# Patient Record
(demographics unavailable — no encounter records)

---

## 2025-05-21 NOTE — HISTORY OF PRESENT ILLNESS
[FreeTextEntry1] : Age Raghav 88 YO, presents for Second opinion. G 7   P [id number] - 7  Menopausal No Medication No family history of breast cancer. To do monthly SBE. Concerned about ovarian cyst seen on MRI by her PCP Dr. Gutierrez Daughter-in-law, Betty Avilez, interpreted. Both ovaries with cysts. Visited DR. GUSTAVO Rivas 1 month ago.  Had blood work- as per DIL- it was normal.  Will refer to Gyn. oncologist.